# Patient Record
Sex: FEMALE | Race: WHITE | NOT HISPANIC OR LATINO | Employment: FULL TIME | URBAN - METROPOLITAN AREA
[De-identification: names, ages, dates, MRNs, and addresses within clinical notes are randomized per-mention and may not be internally consistent; named-entity substitution may affect disease eponyms.]

---

## 2017-11-22 ENCOUNTER — RADIANT APPOINTMENT (OUTPATIENT)
Dept: MAMMOGRAPHY | Facility: CLINIC | Age: 42
End: 2017-11-22
Attending: OBSTETRICS & GYNECOLOGY
Payer: COMMERCIAL

## 2017-11-22 DIAGNOSIS — Z12.31 VISIT FOR SCREENING MAMMOGRAM: ICD-10-CM

## 2017-11-22 PROCEDURE — 77063 BREAST TOMOSYNTHESIS BI: CPT | Performed by: RADIOLOGY

## 2017-11-22 PROCEDURE — G0202 SCR MAMMO BI INCL CAD: HCPCS | Performed by: RADIOLOGY

## 2018-05-29 ENCOUNTER — PATIENT OUTREACH (OUTPATIENT)
Dept: CARE COORDINATION | Facility: CLINIC | Age: 43
End: 2018-05-29

## 2018-05-29 DIAGNOSIS — F32.A DEPRESSION: Primary | ICD-10-CM

## 2018-06-27 ENCOUNTER — TRANSFERRED RECORDS (OUTPATIENT)
Dept: HEALTH INFORMATION MANAGEMENT | Facility: CLINIC | Age: 43
End: 2018-06-27

## 2018-06-27 LAB — TSH SERPL-ACNC: 5.04 UIU/ML (ref 0.45–4.5)

## 2018-07-09 ENCOUNTER — MEDICAL CORRESPONDENCE (OUTPATIENT)
Dept: HEALTH INFORMATION MANAGEMENT | Facility: CLINIC | Age: 43
End: 2018-07-09

## 2018-11-16 ENCOUNTER — PRE VISIT (OUTPATIENT)
Dept: ENDOCRINOLOGY | Facility: CLINIC | Age: 43
End: 2018-11-16

## 2018-11-16 NOTE — TELEPHONE ENCOUNTER
Left message for pt to call back to go over Previsit for pts upcoming appt with Dr Salazar.  Rachell Del Cid, CMA

## 2018-11-20 ENCOUNTER — OFFICE VISIT (OUTPATIENT)
Dept: ENDOCRINOLOGY | Facility: CLINIC | Age: 43
End: 2018-11-20
Payer: COMMERCIAL

## 2018-11-20 VITALS
OXYGEN SATURATION: 97 % | HEART RATE: 75 BPM | HEIGHT: 66 IN | SYSTOLIC BLOOD PRESSURE: 116 MMHG | BODY MASS INDEX: 37.93 KG/M2 | DIASTOLIC BLOOD PRESSURE: 76 MMHG | WEIGHT: 236 LBS

## 2018-11-20 DIAGNOSIS — R53.83 FATIGUE, UNSPECIFIED TYPE: ICD-10-CM

## 2018-11-20 DIAGNOSIS — E66.01 CLASS 3 SEVERE OBESITY WITHOUT SERIOUS COMORBIDITY IN ADULT, UNSPECIFIED BMI, UNSPECIFIED OBESITY TYPE (H): ICD-10-CM

## 2018-11-20 DIAGNOSIS — E66.813 CLASS 3 SEVERE OBESITY WITHOUT SERIOUS COMORBIDITY IN ADULT, UNSPECIFIED BMI, UNSPECIFIED OBESITY TYPE (H): ICD-10-CM

## 2018-11-20 DIAGNOSIS — E06.3 HASHIMOTO'S THYROIDITIS: Primary | ICD-10-CM

## 2018-11-20 LAB
T4 FREE SERPL-MCNC: 0.98 NG/DL (ref 0.76–1.46)
TSH SERPL DL<=0.005 MIU/L-ACNC: 2.38 MU/L (ref 0.4–4)

## 2018-11-20 PROCEDURE — 99204 OFFICE O/P NEW MOD 45 MIN: CPT | Performed by: INTERNAL MEDICINE

## 2018-11-20 PROCEDURE — 84439 ASSAY OF FREE THYROXINE: CPT | Performed by: INTERNAL MEDICINE

## 2018-11-20 PROCEDURE — 84443 ASSAY THYROID STIM HORMONE: CPT | Performed by: INTERNAL MEDICINE

## 2018-11-20 PROCEDURE — 36415 COLL VENOUS BLD VENIPUNCTURE: CPT | Performed by: INTERNAL MEDICINE

## 2018-11-20 RX ORDER — ESCITALOPRAM OXALATE 20 MG/1
30 TABLET ORAL
Refills: 4 | COMMUNITY
Start: 2018-10-15

## 2018-11-20 RX ORDER — LEVOTHYROXINE SODIUM 50 UG/1
TABLET ORAL
Refills: 0 | COMMUNITY
Start: 2018-10-04

## 2018-11-20 NOTE — LETTER
MsTalishaCatherine Dudley  [id number] [place] [id number]        November 20, 2018    Dear MsTalishaDudley,    We are writing to inform you of your test results. The thyroid hormone levels are normal.  You may continue to take the same dose of levothyroxine.    Resulted Orders   TSH   Result Value Ref Range    TSH 2.38 0.40 - 4.00 mU/L   T4 free   Result Value Ref Range    T4 Free 0.98 0.76 - 1.46 ng/dL       Sincerely,    Dr. Kenzie Salazar  [facility]  Endocrine Department/

## 2018-11-20 NOTE — LETTER
11/20/2018         RE: Catherine Dudley  [address] [place] [id number]        Dear Colleague,    Thank you for referring your patient, Catherine Dudley, to the [facility]. Please see a copy of my visit note below.      The patient is seen in consultation at the request of Dr. Dilma Crowell for evaluation of elevated thyroid antibodies.     Catherine Dudley is a 42 year old female with a h/o anxiety and metrorrhagia, diagnosed with hypothyroidism in August this year, when she was started on 50 mcg levothyroxine daily.  The patient reports taking the medication 1 hour prior to breakfast.  She endorses the following symptoms:  A weight gain of 50 pounds in the last 1-2 years, with difficulty losing weight. Currently, she does not follow any diet or exercise schedule.  Per medical records, her weight is up 36 pounds since 2017.  Persistent fatigue for the last year.  On average, she sleeps 4-6 hours nightly.  Quite frequently, she wakes up during the night and she has a hard time falling back asleep.  She is aware of the fact that she does snore.    Outside lab work from 6/27/18 revealed a free T4 of 0.83 (normal range 0.83-1.77), TSH 5.04 (normal range 0.45-4.5), DHEAS 95 and testosterone 13/free testosterone 0.5 (normal).  Antithyroglobulin antibodies were positive at 4.9 (0-0.9), thyroid peroxidase antibodies were 309 (normal range 0-34).    7/2/14 FSH 8.7, estradiol 362.9, LH 8.4, TSH 3.83    8/2008   TSH 2.25     Past Medical History   Anxiety     Past Surgical History   Uterine ablation 2013  C section x 1  Ear tubes as a child     Current Medications  Prescription Medications as of 11/20/2018             escitalopram (LEXAPRO) 20 MG tablet 30 mg    levothyroxine (SYNTHROID/LEVOTHROID) 50 MCG tablet         Family History   Father has HTN, MI in his 60s. Strong family history of obesity on the maternal side of the family.     Social History  . She has 4 children. She denies  smoking, drinking alcohol or using illicit drugs. Occupation: .      Review of Systems   Systemic:             As above   Eye:                      No eye symptoms   Bernard-Laryngeal:     No bernard-laryngeal symptoms, no dysphagia, no hoarseness, no cough     Breast:                  No breast symptoms  Cardiovascular:    No cardiovascular symptoms, no CP or palpitations   Pulmonary:           No pulmonary symptoms, no SOB or cough    Gastrointestinal:   fairly recent mild constipation, intermittently present   Genitourinary:       No genitourinary symptoms, no increased thirst or urination; urinates 0-1 times a night    Endocrine:            No endocrine symptoms, no cold or heat intolerance, no hot flashes; MP not present since the uterine ablation   Neurological:        No neurological symptoms, no headaches, no tremor, no numbness or tingling sensation, no dizziness, no weakness  Musculoskeletal:  In the morning, she has noticed mild feet pain when she starts walking   Skin:                     No skin symptoms, no dry skin, no hair falling out, no new striae   Psychological:     Longstanding anxiety                  Vital Signs     Previous Weights:    Wt Readings from Last 10 Encounters:   11/20/18 107 kg (236 lb)       /76 (BP Location: Left arm, Patient Position: Chair, Cuff Size: Adult Regular)  Pulse 75  Wt 107 kg (236 lb)  SpO2 97%    Physical Exam  General Appearance: she is overweight, pleasant, no distress noted  Eyes:  conjutivae and extra-ocular motions are normal.                                    pupils round and reactive to light, no lid lag, no stare    HEENT:   oropharynx clear and moist, no JVD, no bruits      no thyromegaly, no palpable nodules   Cardiovascular:  regular rhythm, no murmurs, distal pulse palpable, no edema  Respiratory:        chest clear, no rales, no rhonchi   Gastrointestinal:  abdomen soft, non-tender, non-distended, normal bowel sounds,    no  organomegaly  Musculoskeletal:  normal tone and strength  Psychological:          affect and judgment normal  Skin:  warm, no lesions  Neurological:  reflexes normal and symmetric, no resting tremor.     Assessment     Hashimoto thyroiditis    The patient was counseled on the etiology of this condition, signs and symptoms of hypothyroidism, treatment with levothyroxine, the correct administration of this medication, the main minerals, food products and medications which interfere with levothyroxine absorption.  The patient has been taking the levothyroxine correctly.  The plan is to check the thyroid hormone levels today and adjust the dose of levothyroxine accordingly.    Obesity    Counseled on diet and exercise, calorie counting, the importance of establishing a regular exercise schedule, of at least 30 minutes, 5 days a week.  I recommended to follow up with a dietitian and she agreed.    Fatigue    If it does not improve despite normalization of the thyroid hormone levels, I recommended to discuss with her primary care provider the option of pursuing a sleep study.     Orders Placed This Encounter   Procedures     TSH     T4 free     NUTRITION REFERRAL                            The thyroid hormone levels are normal.  You may continue to take the same dose of levothyroxine.    Again, thank you for allowing me to participate in the care of your patient.        Sincerely,        Kenzie Salazar MD

## 2018-11-20 NOTE — PROGRESS NOTES
The patient is seen in consultation at the request of Dr. Dilma Crowell for evaluation of elevated thyroid antibodies.     Catherine Dudley is a 42 year old female with a h/o anxiety and metrorrhagia, diagnosed with hypothyroidism in August this year, when she was started on 50 mcg levothyroxine daily.  The patient reports taking the medication 1 hour prior to breakfast.  She endorses the following symptoms:  A weight gain of 50 pounds in the last 1-2 years, with difficulty losing weight. Currently, she does not follow any diet or exercise schedule.  Per medical records, her weight is up 36 pounds since 2017.  Persistent fatigue for the last year.  On average, she sleeps 4-6 hours nightly.  Quite frequently, she wakes up during the night and she has a hard time falling back asleep.  She is aware of the fact that she does snore.    Outside lab work from 6/27/18 revealed a free T4 of 0.83 (normal range 0.83-1.77), TSH 5.04 (normal range 0.45-4.5), DHEAS 95 and testosterone 13/free testosterone 0.5 (normal).  Antithyroglobulin antibodies were positive at 4.9 (0-0.9), thyroid peroxidase antibodies were 309 (normal range 0-34).    7/2/14 FSH 8.7, estradiol 362.9, LH 8.4, TSH 3.83    8/2008   TSH 2.25     Past Medical History   Anxiety     Past Surgical History   Uterine ablation 2013  C section x 1  Ear tubes as a child     Current Medications  Prescription Medications as of 11/20/2018             escitalopram (LEXAPRO) 20 MG tablet 30 mg    levothyroxine (SYNTHROID/LEVOTHROID) 50 MCG tablet         Family History   Father has HTN, MI in his 60s. Strong family history of obesity on the maternal side of the family.     Social History  . She has 4 children. She denies smoking, drinking alcohol or using illicit drugs. Occupation: .      Review of Systems   Systemic:             As above   Eye:                      No eye symptoms   Shay-Laryngeal:     No shay-laryngeal symptoms, no dysphagia, no  hoarseness, no cough     Breast:                  No breast symptoms  Cardiovascular:    No cardiovascular symptoms, no CP or palpitations   Pulmonary:           No pulmonary symptoms, no SOB or cough    Gastrointestinal:   fairly recent mild constipation, intermittently present   Genitourinary:       No genitourinary symptoms, no increased thirst or urination; urinates 0-1 times a night    Endocrine:            No endocrine symptoms, no cold or heat intolerance, no hot flashes; MP not present since the uterine ablation   Neurological:        No neurological symptoms, no headaches, no tremor, no numbness or tingling sensation, no dizziness, no weakness  Musculoskeletal:  In the morning, she has noticed mild feet pain when she starts walking   Skin:                     No skin symptoms, no dry skin, no hair falling out, no new striae   Psychological:     Longstanding anxiety                  Vital Signs     Previous Weights:    Wt Readings from Last 10 Encounters:   11/20/18 107 kg (236 lb)       /76 (BP Location: Left arm, Patient Position: Chair, Cuff Size: Adult Regular)  Pulse 75  Wt 107 kg (236 lb)  SpO2 97%    Physical Exam  General Appearance: she is overweight, pleasant, no distress noted  Eyes:  conjutivae and extra-ocular motions are normal.                                    pupils round and reactive to light, no lid lag, no stare    HEENT:   oropharynx clear and moist, no JVD, no bruits      no thyromegaly, no palpable nodules   Cardiovascular:  regular rhythm, no murmurs, distal pulse palpable, no edema  Respiratory:        chest clear, no rales, no rhonchi   Gastrointestinal:  abdomen soft, non-tender, non-distended, normal bowel sounds,    no organomegaly  Musculoskeletal:  normal tone and strength  Psychological:          affect and judgment normal  Skin:  warm, no lesions  Neurological:  reflexes normal and symmetric, no resting tremor.     Assessment     Hashimoto thyroiditis    The patient  was counseled on the etiology of this condition, signs and symptoms of hypothyroidism, treatment with levothyroxine, the correct administration of this medication, the main minerals, food products and medications which interfere with levothyroxine absorption.  The patient has been taking the levothyroxine correctly.  The plan is to check the thyroid hormone levels today and adjust the dose of levothyroxine accordingly.    Obesity    Counseled on diet and exercise, calorie counting, the importance of establishing a regular exercise schedule, of at least 30 minutes, 5 days a week.  I recommended to follow up with a dietitian and she agreed.    Fatigue    If it does not improve despite normalization of the thyroid hormone levels, I recommended to discuss with her primary care provider the option of pursuing a sleep study.     Orders Placed This Encounter   Procedures     TSH     T4 free     NUTRITION REFERRAL

## 2018-11-20 NOTE — NURSING NOTE
Catherine Dudley's goals for this visit include: Elevated thyroid  She requests these members of her care team be copied on today's visit information: Yes - pt states she does not see a primary care physician, just OGGEORGIE    PCP: Forest Trivedi    Referring Provider:  Dilma Crowell MD  [facility]  [address]  [place] [id number]    /76 (BP Location: Left arm, Patient Position: Chair, Cuff Size: Adult Regular)  Pulse 75  Wt 107 kg (236 lb)  SpO2 97%    Do you need any medication refills at today's visit? No

## 2018-11-20 NOTE — MR AVS SNAPSHOT
After Visit Summary   11/20/2018    Catherine Dudley    MRN: [id number]           Patient Information     Date Of Birth          1975        Visit Information        Provider Department      11/20/2018 8:00 AM Kenzie Salazar MD [facility] [[email or web address]]        Today's Diagnoses     Hashimoto's thyroiditis    -  1    Class 3 severe obesity without serious comorbidity in adult, unspecified BMI, unspecified obesity type (H)           Follow-ups after your visit        Additional Services     NUTRITION REFERRAL       [place] provider [place] [facility] [place] [facility] [phone]   [email or web address]    Please be aware that coverage of these services is subject to the terms and limitations of your health insurance plan.  Call member services at your health plan with any benefit or coverage questions.      Please bring the following with you to your appointment:    (1) This referral request  (2) Any documents given to you regarding this referral  (3) Any specific questions you have about diet and/or food choices                  Follow-up notes from your care team     Return in about 1 year (around 11/20/2019) for dietician apt, labs today.      Your next 10 appointments already scheduled     Nov 20, 2019  8:00 AM CST   Return Visit with Kenzie Salazar MD   [facility] [[email or web address]] ([facility] [[email or web address]])    [address] [place] [id number] [phone]              Future tests that were ordered for you today     Open Future Orders        Priority Expected Expires Ordered    TSH Routine 11/20/2018 11/20/2019 11/20/2018    T4 free Routine 11/20/2018 11/20/2019 11/20/2018            Who to contact     If you have questions or need follow up information about today's clinic visit or your schedule please contact [facility] directly at [phone].  Normal or non-critical lab  "and imaging results will be communicated to you by MyChart, letter or phone within 4 business days after the clinic has received the results. If you do not hear from us within 7 days, please contact the clinic through Noteleaf or phone. If you have a critical or abnormal lab result, we will notify you by phone as soon as possible.  Submit refill requests through Noteleaf or call your pharmacy and they will forward the refill request to us. Please allow 3 business days for your refill to be completed.          Additional Information About Your Visit        Noteleaf Information     Noteleaf is an electronic gateway that provides easy, online access to your medical records. With Noteleaf, you can request a clinic appointment, read your test results, renew a prescription or communicate with your care team.     To sign up for Noteleaf visit the website at [email or web address]   You will be asked to enter the access code listed below, as well as some personal information. Please follow the directions to create your username and password.     Your access code is: [email or web address]  Expires: 2019  8:38 AM     Your access code will  in 90 days. If you need help or a new code, please contact your [facility] or call [phone] for assistance.        Care EveryWhere ID     This is your Care EveryWhere ID. This could be used by other organizations to access your [facility] medical records  [id number]        Your Vitals Were     Pulse Height Pulse Oximetry BMI (Body Mass Index)          75 1.685 m (5' 6.34\") 97% 37.7 kg/m2         Blood Pressure from Last 3 Encounters:   18 116/76    Weight from Last 3 Encounters:   18 107 kg (236 lb)              We Performed the Following     NUTRITION REFERRAL        Primary Care Provider Office Phone # Fax #    Forest Trivedi [phone]       [address] VENEGAS [address] [place] [phone]      "   Equal Access to Services     [facility] : Hadii aad ku hadtrinomary Evedavid, wajocelynnda luqtamraha, qapaulta sanjivtanishaenio gage. So [facility] [phone].    ATENCIÓN: Si habla español, tiene a ge disposición servicios gratuitos de asistencia lingüística. Llame al [phone].    We comply with applicable federal civil rights laws and [place] laws. We do not discriminate on the basis of race, color, national origin, age, disability, sex, sexual orientation, or gender identity.            Thank you!     Thank you for choosing [facility]  for your care. Our goal is always to provide you with excellent care. Hearing back from our patients is one way we can continue to improve our services. Please take a few minutes to complete the written survey that you may receive in the mail after your visit with us. Thank you!             Your Updated Medication List - Protect others around you: Learn how to safely use, store and throw away your medicines at www.disposemymeds.org.          This list is accurate as of 11/20/18  8:38 AM.  Always use your most recent med list.                   Brand Name Dispense Instructions for use Diagnosis    escitalopram 20 MG tablet    LEXAPRO     30 mg        levothyroxine 50 MCG tablet    SYNTHROID/LEVOTHROID

## 2021-02-23 ENCOUNTER — ANCILLARY PROCEDURE (OUTPATIENT)
Dept: MAMMOGRAPHY | Facility: CLINIC | Age: 46
End: 2021-02-23
Attending: OBSTETRICS & GYNECOLOGY
Payer: COMMERCIAL

## 2021-02-23 DIAGNOSIS — Z12.31 VISIT FOR SCREENING MAMMOGRAM: ICD-10-CM

## 2021-02-23 PROCEDURE — 77067 SCR MAMMO BI INCL CAD: CPT | Mod: GC | Performed by: RADIOLOGY

## 2021-02-23 PROCEDURE — 77063 BREAST TOMOSYNTHESIS BI: CPT | Mod: GC | Performed by: RADIOLOGY

## 2023-09-08 ENCOUNTER — ANCILLARY ORDERS (OUTPATIENT)
Dept: MAMMOGRAPHY | Facility: CLINIC | Age: 48
End: 2023-09-08

## 2023-09-08 DIAGNOSIS — Z12.31 VISIT FOR SCREENING MAMMOGRAM: Primary | ICD-10-CM

## 2023-10-12 ENCOUNTER — ANCILLARY PROCEDURE (OUTPATIENT)
Dept: MAMMOGRAPHY | Facility: CLINIC | Age: 48
End: 2023-10-12
Attending: OBSTETRICS & GYNECOLOGY
Payer: COMMERCIAL

## 2023-10-12 DIAGNOSIS — Z12.31 VISIT FOR SCREENING MAMMOGRAM: ICD-10-CM

## 2023-10-12 PROCEDURE — 77067 SCR MAMMO BI INCL CAD: CPT | Mod: GC

## 2023-10-12 PROCEDURE — 77063 BREAST TOMOSYNTHESIS BI: CPT | Mod: GC

## 2024-11-05 ENCOUNTER — ANCILLARY ORDERS (OUTPATIENT)
Dept: GENERAL RADIOLOGY | Facility: CLINIC | Age: 49
End: 2024-11-05

## 2024-11-05 ENCOUNTER — ANCILLARY ORDERS (OUTPATIENT)
Dept: MAMMOGRAPHY | Facility: CLINIC | Age: 49
End: 2024-11-05

## 2024-11-05 DIAGNOSIS — R92.8 ABNORMAL MAMMOGRAM: Primary | ICD-10-CM

## 2024-11-05 DIAGNOSIS — Z12.31 SCREENING MAMMOGRAM, ENCOUNTER FOR: Primary | ICD-10-CM

## 2024-11-06 ENCOUNTER — ANCILLARY PROCEDURE (OUTPATIENT)
Dept: MAMMOGRAPHY | Facility: CLINIC | Age: 49
End: 2024-11-06
Attending: OBSTETRICS & GYNECOLOGY
Payer: COMMERCIAL

## 2024-11-06 DIAGNOSIS — Z12.31 SCREENING MAMMOGRAM, ENCOUNTER FOR: ICD-10-CM

## 2024-11-06 PROCEDURE — 77063 BREAST TOMOSYNTHESIS BI: CPT | Mod: GC | Performed by: RADIOLOGY

## 2024-11-06 PROCEDURE — 77067 SCR MAMMO BI INCL CAD: CPT | Mod: GC | Performed by: RADIOLOGY
